# Patient Record
Sex: FEMALE | Race: WHITE | NOT HISPANIC OR LATINO | URBAN - METROPOLITAN AREA
[De-identification: names, ages, dates, MRNs, and addresses within clinical notes are randomized per-mention and may not be internally consistent; named-entity substitution may affect disease eponyms.]

---

## 2018-02-13 ENCOUNTER — EMERGENCY (EMERGENCY)
Facility: HOSPITAL | Age: 83
LOS: 1 days | Discharge: HOME | End: 2018-02-13
Attending: EMERGENCY MEDICINE | Admitting: EMERGENCY MEDICINE
Payer: MEDICARE

## 2018-02-13 ENCOUNTER — TRANSCRIPTION ENCOUNTER (OUTPATIENT)
Age: 83
End: 2018-02-13

## 2018-02-13 VITALS
HEART RATE: 88 BPM | RESPIRATION RATE: 16 BRPM | SYSTOLIC BLOOD PRESSURE: 142 MMHG | DIASTOLIC BLOOD PRESSURE: 80 MMHG | OXYGEN SATURATION: 98 % | TEMPERATURE: 98 F

## 2018-02-13 VITALS
WEIGHT: 143.96 LBS | TEMPERATURE: 98 F | RESPIRATION RATE: 16 BRPM | OXYGEN SATURATION: 98 % | DIASTOLIC BLOOD PRESSURE: 85 MMHG | SYSTOLIC BLOOD PRESSURE: 157 MMHG | HEART RATE: 100 BPM

## 2018-02-13 LAB
APTT BLD: 37.5 SEC — HIGH (ref 27.5–37.4)
INR BLD: 1.78 RATIO — HIGH (ref 0.88–1.16)
PROTHROM AB SERPL-ACNC: 19.6 SEC — HIGH (ref 9.8–12.7)

## 2018-02-13 PROCEDURE — 30901 CONTROL OF NOSEBLEED: CPT

## 2018-02-13 PROCEDURE — 99283 EMERGENCY DEPT VISIT LOW MDM: CPT | Mod: 25

## 2018-02-13 PROCEDURE — 30901 CONTROL OF NOSEBLEED: CPT | Mod: LT

## 2018-02-13 PROCEDURE — 85730 THROMBOPLASTIN TIME PARTIAL: CPT

## 2018-02-13 PROCEDURE — 85610 PROTHROMBIN TIME: CPT

## 2018-02-13 PROCEDURE — 36415 COLL VENOUS BLD VENIPUNCTURE: CPT

## 2018-02-13 NOTE — ED ADULT NURSE NOTE - PMH
Afib    Anemia    Aortic stenosis    Arthritis    Breast cancer    CAD (coronary artery disease)    Cataract    CHF (congestive heart failure)    DM (diabetes mellitus), type 2    GERD (gastroesophageal reflux disease)    HLD (hyperlipidemia)    HTN (hypertension)

## 2018-02-13 NOTE — ED PROVIDER NOTE - PROGRESS NOTE DETAILS
merocel packing inserted to left nare, will observe patient states she has seen Dr Perez in the past. epistaxis resolved, advised keep her appt with Dr Penaloza tomorrow, call Dr Perez to schedule appt with 24-48 hrs.  copy of INR given

## 2018-02-13 NOTE — ED PROVIDER NOTE - OBJECTIVE STATEMENT
88 y female brought to ed by daughter presents with left nare nosebleeed x 1 hr, denies trauma,  states she has a sinus infection, is on abx x 2 days prescribed by her pmd Dr Penaloza,  was coughing and was going to blow her nose, then nosebleed began.  denies pain.  patient is on coumadin for hx of afib, was schedule to have her inr checked tomorrow at Dr Palomo office.

## 2018-02-13 NOTE — ED ADULT NURSE NOTE - PSH
S/P appendectomy    S/P bladder repair    S/P hysterectomy    S/P mastectomy  Right  S/P total knee replacement  x 3  Stented coronary artery  X 1  (mid RCA)

## 2018-02-13 NOTE — ED ADULT NURSE REASSESSMENT NOTE - NS ED NURSE REASSESS COMMENT FT1
pt d/renata home verbalized understanding of d/c instruction- ambulated from FT is stable condition-

## 2019-06-03 NOTE — ED ADULT NURSE NOTE - NS ED NOTE  TALK SOMEONE YN
At [facility], we strive to deliver an exceptional experience to you, every time we see you.  If you receive a survey in the mail, please send us back your thoughts. We really do value your feedback.    Your care team:                            Family Medicine Internal Medicine   MD Ortiz [place] MD Shantel Branch-Fleming, MD Katya Georgiev PA-C Megan Hill, APRN CNP    Elijah Stark, MD Pediatrics   Blaine Le, MATHIEU Quintanilla, CNP MD Katy Blair APRN CNP   MD Paulette Ang MD Deborah Mielke, MD Gayatri Waller, APRN [facility]      Clinic hours: Monday - Thursday 7 am-7 pm; Fridays 7 am-5 pm.   Urgent care: Monday - Friday 11 am-9 pm; Saturday and Sunday 9 am-5 pm.  Pharmacy : Monday -Thursday 8 am-8 pm; Friday 8 am-6 pm; Saturday and Sunday 9 am-5 pm.     Clinic: [phone]   Pharmacy: [phone]        Patient Education     Puncture Wound    A puncture wound occurs when a pointed object pushes into the skin. It may go into the tissues below the skin, including fat and muscle. This type of wound is narrow and deep and can be hard to clean. Because of this, puncture wounds are at high risk for becoming infected.  X-rays may be done to check the wound for objects stuck under the skin. Your may also need a tetanus shot. This is given if you are not up-to-date on this vaccination and the object that caused the wound may lead to tetanus.  Home care    Your healthcare provider may prescribe an antibiotic. This is to help prevent infection. Follow all instructions for taking this medicine. Take the medicine every day until it is gone or you are told to stop. You should not have any left over.    The healthcare provider may prescribe medicines for pain. Follow instructions for taking them.    You can take acetaminophen or ibuprofen for pain, unless you were given a different pain medicine to use.     Follow the healthcare provider s  instructions on how to care for the wound.    Keep the wound clean and dry. Don't get the wound wet until you are told it is OK to do so. If the area gets wet, gently pat it dry with a clean cloth. Replace the wet bandage with a dry one.    If a bandage was applied and it becomes wet or dirty, replace it. Otherwise, leave it in place for the first 24 hours.    Once you can get the wound wet, you may shower as usual but don't soak the wound in water (no tub baths or swimming)    Even with proper treatment, a puncture wound may become infected. Check the wound daily for signs of infection listed below.  Follow-up care  Follow up with your healthcare provider, or as advised.   When to seek medical advice  Call your healthcare provider right away if any of these occur:    Signs of infection, including:  ? Increasing redness or swelling around the wound  ? Increased warmth of the wound  ? Worsening pain  ? Red streaking lines away from the wound  ? Draining pus    Fever of 100.4 F (38. C) or higher, or as directed by your healthcare provider    Wound changes colors    Numbness around the wound    Decreased movement around the injured area  Date Last Reviewed: 3/1/2018    9714-3241 [employer]. [address] [place] [address], [place] [address]. All rights reserved. This information is not intended as a substitute for professional medical care. Always follow your healthcare professional's instructions.            No